# Patient Record
Sex: FEMALE | Race: OTHER | NOT HISPANIC OR LATINO
[De-identification: names, ages, dates, MRNs, and addresses within clinical notes are randomized per-mention and may not be internally consistent; named-entity substitution may affect disease eponyms.]

---

## 2018-10-15 PROBLEM — Z00.00 ENCOUNTER FOR PREVENTIVE HEALTH EXAMINATION: Status: ACTIVE | Noted: 2018-10-15

## 2018-10-24 ENCOUNTER — APPOINTMENT (OUTPATIENT)
Dept: ORTHOPEDIC SURGERY | Facility: CLINIC | Age: 48
End: 2018-10-24
Payer: COMMERCIAL

## 2018-10-24 VITALS
WEIGHT: 170 LBS | DIASTOLIC BLOOD PRESSURE: 87 MMHG | SYSTOLIC BLOOD PRESSURE: 133 MMHG | HEART RATE: 76 BPM | BODY MASS INDEX: 27.32 KG/M2 | HEIGHT: 66 IN

## 2018-10-24 PROCEDURE — 99203 OFFICE O/P NEW LOW 30 MIN: CPT | Mod: 25

## 2018-10-24 PROCEDURE — 20610 DRAIN/INJ JOINT/BURSA W/O US: CPT | Mod: RT

## 2018-11-05 ENCOUNTER — APPOINTMENT (OUTPATIENT)
Dept: ORTHOPEDIC SURGERY | Facility: CLINIC | Age: 48
End: 2018-11-05
Payer: COMMERCIAL

## 2018-11-05 VITALS
HEIGHT: 66 IN | WEIGHT: 170 LBS | DIASTOLIC BLOOD PRESSURE: 83 MMHG | HEART RATE: 64 BPM | SYSTOLIC BLOOD PRESSURE: 118 MMHG | BODY MASS INDEX: 27.32 KG/M2

## 2018-11-05 DIAGNOSIS — M75.41 IMPINGEMENT SYNDROME OF RIGHT SHOULDER: ICD-10-CM

## 2018-11-05 PROCEDURE — 99213 OFFICE O/P EST LOW 20 MIN: CPT

## 2018-11-05 RX ORDER — MELOXICAM 15 MG/1
15 TABLET ORAL DAILY
Qty: 30 | Refills: 1 | Status: ACTIVE | COMMUNITY
Start: 2018-11-05 | End: 1900-01-01

## 2019-01-14 ENCOUNTER — APPOINTMENT (OUTPATIENT)
Dept: ORTHOPEDIC SURGERY | Facility: CLINIC | Age: 49
End: 2019-01-14

## 2019-11-04 ENCOUNTER — APPOINTMENT (OUTPATIENT)
Dept: ORTHOPEDIC SURGERY | Facility: CLINIC | Age: 49
End: 2019-11-04
Payer: COMMERCIAL

## 2019-11-04 VITALS
SYSTOLIC BLOOD PRESSURE: 123 MMHG | WEIGHT: 176 LBS | HEART RATE: 75 BPM | HEIGHT: 66 IN | BODY MASS INDEX: 28.28 KG/M2 | DIASTOLIC BLOOD PRESSURE: 85 MMHG

## 2019-11-04 DIAGNOSIS — M75.01 ADHESIVE CAPSULITIS OF RIGHT SHOULDER: ICD-10-CM

## 2019-11-04 DIAGNOSIS — M25.511 PAIN IN RIGHT SHOULDER: ICD-10-CM

## 2019-11-04 PROCEDURE — 99213 OFFICE O/P EST LOW 20 MIN: CPT

## 2020-11-27 ENCOUNTER — EMERGENCY (EMERGENCY)
Facility: HOSPITAL | Age: 50
LOS: 1 days | Discharge: HOME | End: 2020-11-27
Attending: EMERGENCY MEDICINE | Admitting: EMERGENCY MEDICINE
Payer: COMMERCIAL

## 2020-11-27 VITALS
DIASTOLIC BLOOD PRESSURE: 84 MMHG | OXYGEN SATURATION: 97 % | HEIGHT: 66 IN | TEMPERATURE: 98 F | SYSTOLIC BLOOD PRESSURE: 122 MMHG | RESPIRATION RATE: 18 BRPM | HEART RATE: 67 BPM | WEIGHT: 179.9 LBS

## 2020-11-27 VITALS
DIASTOLIC BLOOD PRESSURE: 79 MMHG | HEART RATE: 71 BPM | TEMPERATURE: 98 F | OXYGEN SATURATION: 98 % | SYSTOLIC BLOOD PRESSURE: 118 MMHG | RESPIRATION RATE: 19 BRPM

## 2020-11-27 LAB
ALBUMIN SERPL ELPH-MCNC: 3.8 G/DL — SIGNIFICANT CHANGE UP (ref 3.3–5)
ALP SERPL-CCNC: 75 U/L — SIGNIFICANT CHANGE UP (ref 40–120)
ALT FLD-CCNC: 30 U/L — SIGNIFICANT CHANGE UP (ref 12–78)
ANION GAP SERPL CALC-SCNC: 8 MMOL/L — SIGNIFICANT CHANGE UP (ref 5–17)
APTT BLD: 33.2 SEC — SIGNIFICANT CHANGE UP (ref 27.5–35.5)
AST SERPL-CCNC: 18 U/L — SIGNIFICANT CHANGE UP (ref 15–37)
BASOPHILS # BLD AUTO: 0.03 K/UL — SIGNIFICANT CHANGE UP (ref 0–0.2)
BASOPHILS NFR BLD AUTO: 0.4 % — SIGNIFICANT CHANGE UP (ref 0–2)
BILIRUB SERPL-MCNC: 0.3 MG/DL — SIGNIFICANT CHANGE UP (ref 0.2–1.2)
BUN SERPL-MCNC: 11 MG/DL — SIGNIFICANT CHANGE UP (ref 7–23)
CALCIUM SERPL-MCNC: 8.7 MG/DL — SIGNIFICANT CHANGE UP (ref 8.5–10.1)
CHLORIDE SERPL-SCNC: 108 MMOL/L — SIGNIFICANT CHANGE UP (ref 96–108)
CK MB CFR SERPL CALC: <1 NG/ML — SIGNIFICANT CHANGE UP (ref 0–3.6)
CO2 SERPL-SCNC: 25 MMOL/L — SIGNIFICANT CHANGE UP (ref 22–31)
CREAT SERPL-MCNC: 0.78 MG/DL — SIGNIFICANT CHANGE UP (ref 0.5–1.3)
D DIMER BLD IA.RAPID-MCNC: 185 NG/ML DDU — SIGNIFICANT CHANGE UP
EOSINOPHIL # BLD AUTO: 0.12 K/UL — SIGNIFICANT CHANGE UP (ref 0–0.5)
EOSINOPHIL NFR BLD AUTO: 1.7 % — SIGNIFICANT CHANGE UP (ref 0–6)
GLUCOSE SERPL-MCNC: 99 MG/DL — SIGNIFICANT CHANGE UP (ref 70–99)
HCG SERPL-ACNC: <1 MIU/ML — SIGNIFICANT CHANGE UP
HCT VFR BLD CALC: 36.6 % — SIGNIFICANT CHANGE UP (ref 34.5–45)
HGB BLD-MCNC: 12 G/DL — SIGNIFICANT CHANGE UP (ref 11.5–15.5)
IMM GRANULOCYTES NFR BLD AUTO: 0.3 % — SIGNIFICANT CHANGE UP (ref 0–1.5)
INR BLD: 1.02 RATIO — SIGNIFICANT CHANGE UP (ref 0.88–1.16)
LYMPHOCYTES # BLD AUTO: 2.56 K/UL — SIGNIFICANT CHANGE UP (ref 1–3.3)
LYMPHOCYTES # BLD AUTO: 37.3 % — SIGNIFICANT CHANGE UP (ref 13–44)
MCHC RBC-ENTMCNC: 28.8 PG — SIGNIFICANT CHANGE UP (ref 27–34)
MCHC RBC-ENTMCNC: 32.8 GM/DL — SIGNIFICANT CHANGE UP (ref 32–36)
MCV RBC AUTO: 87.8 FL — SIGNIFICANT CHANGE UP (ref 80–100)
MONOCYTES # BLD AUTO: 0.47 K/UL — SIGNIFICANT CHANGE UP (ref 0–0.9)
MONOCYTES NFR BLD AUTO: 6.8 % — SIGNIFICANT CHANGE UP (ref 2–14)
NEUTROPHILS # BLD AUTO: 3.67 K/UL — SIGNIFICANT CHANGE UP (ref 1.8–7.4)
NEUTROPHILS NFR BLD AUTO: 53.5 % — SIGNIFICANT CHANGE UP (ref 43–77)
NRBC # BLD: 0 /100 WBCS — SIGNIFICANT CHANGE UP (ref 0–0)
NT-PROBNP SERPL-SCNC: 95 PG/ML — SIGNIFICANT CHANGE UP (ref 0–125)
PLATELET # BLD AUTO: 237 K/UL — SIGNIFICANT CHANGE UP (ref 150–400)
POTASSIUM SERPL-MCNC: 4 MMOL/L — SIGNIFICANT CHANGE UP (ref 3.5–5.3)
POTASSIUM SERPL-SCNC: 4 MMOL/L — SIGNIFICANT CHANGE UP (ref 3.5–5.3)
PROT SERPL-MCNC: 7.8 G/DL — SIGNIFICANT CHANGE UP (ref 6–8.3)
PROTHROM AB SERPL-ACNC: 11.9 SEC — SIGNIFICANT CHANGE UP (ref 10.6–13.6)
RBC # BLD: 4.17 M/UL — SIGNIFICANT CHANGE UP (ref 3.8–5.2)
RBC # FLD: 14 % — SIGNIFICANT CHANGE UP (ref 10.3–14.5)
SODIUM SERPL-SCNC: 141 MMOL/L — SIGNIFICANT CHANGE UP (ref 135–145)
TROPONIN I SERPL-MCNC: <.015 NG/ML — SIGNIFICANT CHANGE UP (ref 0.01–0.04)
WBC # BLD: 6.87 K/UL — SIGNIFICANT CHANGE UP (ref 3.8–10.5)
WBC # FLD AUTO: 6.87 K/UL — SIGNIFICANT CHANGE UP (ref 3.8–10.5)

## 2020-11-27 PROCEDURE — 82553 CREATINE MB FRACTION: CPT

## 2020-11-27 PROCEDURE — 85379 FIBRIN DEGRADATION QUANT: CPT

## 2020-11-27 PROCEDURE — 85025 COMPLETE CBC W/AUTO DIFF WBC: CPT

## 2020-11-27 PROCEDURE — 99284 EMERGENCY DEPT VISIT MOD MDM: CPT

## 2020-11-27 PROCEDURE — U0003: CPT

## 2020-11-27 PROCEDURE — 93010 ELECTROCARDIOGRAM REPORT: CPT

## 2020-11-27 PROCEDURE — 71045 X-RAY EXAM CHEST 1 VIEW: CPT

## 2020-11-27 PROCEDURE — 85730 THROMBOPLASTIN TIME PARTIAL: CPT

## 2020-11-27 PROCEDURE — 83880 ASSAY OF NATRIURETIC PEPTIDE: CPT

## 2020-11-27 PROCEDURE — 36415 COLL VENOUS BLD VENIPUNCTURE: CPT

## 2020-11-27 PROCEDURE — 84484 ASSAY OF TROPONIN QUANT: CPT

## 2020-11-27 PROCEDURE — 80053 COMPREHEN METABOLIC PANEL: CPT

## 2020-11-27 PROCEDURE — 93005 ELECTROCARDIOGRAM TRACING: CPT

## 2020-11-27 PROCEDURE — 85610 PROTHROMBIN TIME: CPT

## 2020-11-27 PROCEDURE — 71045 X-RAY EXAM CHEST 1 VIEW: CPT | Mod: 26

## 2020-11-27 PROCEDURE — 99284 EMERGENCY DEPT VISIT MOD MDM: CPT | Mod: 25

## 2020-11-27 PROCEDURE — 84702 CHORIONIC GONADOTROPIN TEST: CPT

## 2020-11-27 RX ORDER — IBUPROFEN 200 MG
600 TABLET ORAL ONCE
Refills: 0 | Status: COMPLETED | OUTPATIENT
Start: 2020-11-27 | End: 2020-11-27

## 2020-11-27 RX ORDER — SODIUM CHLORIDE 9 MG/ML
1000 INJECTION INTRAMUSCULAR; INTRAVENOUS; SUBCUTANEOUS ONCE
Refills: 0 | Status: COMPLETED | OUTPATIENT
Start: 2020-11-27 | End: 2020-11-27

## 2020-11-27 RX ADMIN — SODIUM CHLORIDE 1000 MILLILITER(S): 9 INJECTION INTRAMUSCULAR; INTRAVENOUS; SUBCUTANEOUS at 20:31

## 2020-11-27 RX ADMIN — Medication 600 MILLIGRAM(S): at 20:31

## 2020-11-27 NOTE — ED PROVIDER NOTE - TEMPLATE
[facility] [facility] EMERGENCY DEPT One [address] [id number] [phone] Work/School Note Date: 2/28/2018 To Whom It May concern: 
 
Nydia Romo was seen and treated today in the emergency room by the following provider(s): 
Attending Provider: Phoenix Peoples MD.   
 
Nydia Romo may return to school on 3/01/18, Please excuse is needed February 28.  
 
Sincerely, 
 
 
 
 
Phoenix Peoples MD 
 
 
 
 Respiratory

## 2020-11-27 NOTE — ED ADULT TRIAGE NOTE - CHIEF COMPLAINT QUOTE
feels headache, body aches and intermittent shortness of breath with exertion. Pt breathing unlabored in triage. Took tylenol today without much relief.

## 2020-11-27 NOTE — ED PROVIDER NOTE - CARE PROVIDER_API CALL
Casey Smith  INTERNAL MEDICINE  [id number] [place] [address]  Phone: [phone]  Fax: [phone]  Follow Up Time:

## 2020-11-27 NOTE — ED PROVIDER NOTE - OBJECTIVE STATEMENT
50 female presents to ER c/o shortness of breath, headache and body aches for 2 days, denies fever, no known covid exposure.

## 2020-11-27 NOTE — ED PROVIDER NOTE - PATIENT PORTAL LINK FT
You can access the FollowMyHealth Patient Portal offered by [facility] by registering at the following website: [email or web address]. By joining [facility]’s FollowMyHealth portal, you will also be able to view your health information using other applications (apps) compatible with our system.

## 2020-11-27 NOTE — ED PROVIDER NOTE - PROGRESS NOTE DETAILS
patient feeling well, wants to go home, labs, chest xray, ekg reviwed, no acute findings, pending covid, agrees to f/u with PMD, understands to return to ER for worsnieng of symptoms

## 2020-11-28 LAB — SARS-COV-2 RNA SPEC QL NAA+PROBE: SIGNIFICANT CHANGE UP

## 2021-04-26 NOTE — ED PROVIDER NOTE - CHPI ED SYMPTOMS NEG
no diaphoresis/no fever/no hemoptysis Xerosis Normal Treatment: I recommended application of Cetaphil or CeraVe numerous times a day going to bed to all dry areas.

## 2023-03-16 ENCOUNTER — EMERGENCY (EMERGENCY)
Facility: HOSPITAL | Age: 53
LOS: 1 days | Discharge: HOME | End: 2023-03-16
Attending: EMERGENCY MEDICINE | Admitting: EMERGENCY MEDICINE
Payer: COMMERCIAL

## 2023-03-16 VITALS
SYSTOLIC BLOOD PRESSURE: 144 MMHG | DIASTOLIC BLOOD PRESSURE: 81 MMHG | WEIGHT: 182.98 LBS | TEMPERATURE: 98 F | HEIGHT: 66 IN | HEART RATE: 73 BPM | OXYGEN SATURATION: 97 % | RESPIRATION RATE: 16 BRPM

## 2023-03-16 VITALS
OXYGEN SATURATION: 99 % | HEART RATE: 75 BPM | DIASTOLIC BLOOD PRESSURE: 80 MMHG | SYSTOLIC BLOOD PRESSURE: 125 MMHG | RESPIRATION RATE: 18 BRPM | TEMPERATURE: 98 F

## 2023-03-16 PROCEDURE — G1004: CPT

## 2023-03-16 PROCEDURE — 72125 CT NECK SPINE W/O DYE: CPT | Mod: MG

## 2023-03-16 PROCEDURE — 99285 EMERGENCY DEPT VISIT HI MDM: CPT

## 2023-03-16 PROCEDURE — 73564 X-RAY EXAM KNEE 4 OR MORE: CPT | Mod: 26,LT,77

## 2023-03-16 PROCEDURE — 90715 TDAP VACCINE 7 YRS/> IM: CPT

## 2023-03-16 PROCEDURE — 73080 X-RAY EXAM OF ELBOW: CPT

## 2023-03-16 PROCEDURE — 70486 CT MAXILLOFACIAL W/O DYE: CPT | Mod: 26,MA

## 2023-03-16 PROCEDURE — 73564 X-RAY EXAM KNEE 4 OR MORE: CPT | Mod: 26,RT

## 2023-03-16 PROCEDURE — 90471 IMMUNIZATION ADMIN: CPT

## 2023-03-16 PROCEDURE — 70486 CT MAXILLOFACIAL W/O DYE: CPT | Mod: MA

## 2023-03-16 PROCEDURE — 70450 CT HEAD/BRAIN W/O DYE: CPT | Mod: MG

## 2023-03-16 PROCEDURE — 72125 CT NECK SPINE W/O DYE: CPT | Mod: 26,MG

## 2023-03-16 PROCEDURE — 99284 EMERGENCY DEPT VISIT MOD MDM: CPT | Mod: 25

## 2023-03-16 PROCEDURE — 73080 X-RAY EXAM OF ELBOW: CPT | Mod: 26,RT

## 2023-03-16 PROCEDURE — 70450 CT HEAD/BRAIN W/O DYE: CPT | Mod: 26,MG

## 2023-03-16 PROCEDURE — 73564 X-RAY EXAM KNEE 4 OR MORE: CPT

## 2023-03-16 RX ORDER — TETANUS TOXOID, REDUCED DIPHTHERIA TOXOID AND ACELLULAR PERTUSSIS VACCINE, ADSORBED 5; 2.5; 8; 8; 2.5 [IU]/.5ML; [IU]/.5ML; UG/.5ML; UG/.5ML; UG/.5ML
0.5 SUSPENSION INTRAMUSCULAR ONCE
Refills: 0 | Status: COMPLETED | OUTPATIENT
Start: 2023-03-16 | End: 2023-03-16

## 2023-03-16 RX ADMIN — TETANUS TOXOID, REDUCED DIPHTHERIA TOXOID AND ACELLULAR PERTUSSIS VACCINE, ADSORBED 0.5 MILLILITER(S): 5; 2.5; 8; 8; 2.5 SUSPENSION INTRAMUSCULAR at 19:21

## 2023-03-16 RX ADMIN — Medication 1 TABLET(S): at 22:34

## 2023-03-16 NOTE — ED PROVIDER NOTE - CLINICAL SUMMARY MEDICAL DECISION MAKING FREE TEXT BOX
Mechanical fall today with head injury and bilateral knee and elbow pain.  No anticoagulant use.  Will check CT head, neck, face.  Will check x-rays of bilateral knees and right elbow.  Outpatient follow-up.

## 2023-03-16 NOTE — ED PROVIDER NOTE - WR INTERPRETATION DATE TIME  2
Patient Active Problem List   Diagnosis   • HBP, Rx 1994   • DIABETES UNCOMPL ADULT-TYPE II, 1999   • HYPERLIPIDEMIA   chol 278 in 2002   • Gout, unspecified   • Nonspecific elevation of levels of transaminase or lactic acid dehydrogenase (LDH)   • Allergic rhinitis, cause unspecified   • HYPOTHYROIDISM  TSH--6.94, 2006   • ED   • COLONIC POLYPS, adenoma, 2009     Had mid foot pain in Dec.  OK now.  On allopurinol 300/d.  Sugars 90 to 110, no reactions.   BP at home was 120/70.  No prob w Rx.    PE Chest CTA. Ht RR, no mur, ext wo edema  LDL 85,  glyco 6.4,  Hep C neg, urate 7.4    IMP Transient podagra, now resolved w urate of 7.4          DM, OK          HBP, OK          LDL OK    PLAN options disc, Same Rx, declines flushot,  RTC 6 mo w lab, wellness visit         16-Mar-2023 20:37

## 2023-03-16 NOTE — ED PROVIDER NOTE - CARE PLAN
Principal Discharge DX:	Facial contusion, initial encounter  Secondary Diagnosis:	Knee contusion   1 Principal Discharge DX:	Facial contusion, initial encounter  Secondary Diagnosis:	Knee contusion  Secondary Diagnosis:	Nasal fracture

## 2023-03-16 NOTE — ED PROVIDER NOTE - PROVIDER TOKENS
PROVIDER:[TOKEN:[[id number]]],PROVIDER:[TOKEN:[[id number]]] PROVIDER:[TOKEN:[[id number]]],PROVIDER:[TOKEN:[[id number]]],PROVIDER:[TOKEN:[[id number]]]

## 2023-03-16 NOTE — ED PROVIDER NOTE - NSFOLLOWUPINSTRUCTIONS_ED_ALL_ED_FT
1) Follow-up with your Primary Medical Doctor. Call tomorrow for prompt follow-up.  2) Return to Emergency room for any worsening or persistent pain, shortness of breath, chest pains, abdominal pain, numbness, tingling, headaches, vomiting, visual changes, dizziness, weakness, fever, or any other concerning symptoms.  3) See attached instruction sheets for additional information, including information regarding signs and symptoms to look out for, reasons to seek immediate care and other important instructions.  4) Follow-up with orthopedics, call tomorrow for prompt follow-up  5) Percocet as needed for pain, no driving 1) Follow-up with your Primary Medical Doctor and Dr. Sandoval. Call tomorrow for prompt follow-up.  2) Return to Emergency room for any worsening or persistent pain, shortness of breath, chest pains, abdominal pain, numbness, tingling, headaches, vomiting, visual changes, dizziness, weakness, fever, or any other concerning symptoms.  3) See attached instruction sheets for additional information, including information regarding signs and symptoms to look out for, reasons to seek immediate care and other important instructions.  4) Follow-up with orthopedics, call tomorrow for prompt follow-up  5) Percocet as needed for pain, no driving      A nasal fracture is a break or crack in the bones of the nose or the tissue that helps to form the nose (cartilage). Minor breaks do not require treatment and usually heal on their own after about one month. Serious breaks may require treatment that could include surgery.      What are the causes?    A nasal fracture is usually caused by the strong force of a direct hit to the nose (blunt injury). This type of injury often occurs from:  •Playing a contact sport.      •Being involved in a car accident.      •Falling.      •Getting punched in the face.        What are the signs or symptoms?    Symptoms of this condition include:  •Pain.      •Swelling of the nose.      •Bleeding from the nose.      •Bruising around the nose or bruising around the eyes (black eyes).      •Crooked appearance of the nose.        How is this diagnosed?    This condition may be diagnosed based on a physical exam. During the exam, the health care provider will:  •Gently feel the nose for signs of broken bones.      •Look inside the nostrils to check if there is a blood-filled swelling on the dividing wall between the nostrils (septal hematoma).      An X-ray of the nose may be taken. Sometimes, an X-ray may not show a nasal fracture even when one is present. In some cases, X-rays or a CT scan may be taken again 1–5 days later after the swelling has gone down.      How is this treated?    Treatment for this condition depends on the severity of the injury.  •Minor fractures that have not caused deformity often do not require treatment. They may heal on their own.    •For more serious fractures that have caused bones to move out of position, treatment may involve one of the following:  •Repositioning the bones without surgery. The health care provider may be able to do this in his or her office after you are given medicine to numb the nasal area (local anesthetic).      •Surgery. If surgery is needed, it will be done after the swelling is gone. Surgery will stabilize and align the fracture.          Follow these instructions at home:      Bag of ice on a towel on the skin.        The correct and incorrect way to hold your head and fingers for first aid during a nosebleed.     Activity     •Return to your normal activities as told by your health care provider. Ask your health care provider what activities are safe for you.      • Do not play contact sports for 3–4 weeks or as told by your health care provider.      Managing pain and swelling     If directed, put ice on the injured area. To do this:  •Put ice in a plastic bag.      •Place a towel between your skin and the bag.      •Leave the ice on for 20 minutes, 2–3 times a day.      •Take off the ice if your skin turns bright red. This is very important. If you cannot feel pain, heat, or cold, you have a greater risk of damage to the area.      General instructions    •Take over-the-counter and prescription medicines only as told by your health care provider.      •If your nose starts to bleed, sit in an upright position while you squeeze the soft parts of your nose against the dividing wall between your nostrils (septum) for 10 minutes.      •Try to avoid blowing your nose.      •Keep all follow-up visits. This is important.        Contact a health care provider if:    •Your pain increases or becomes severe.      •You continue to have nosebleeds.      •The shape of your nose does not return to normal within 5 days.      •You have pus draining out of your nose.        Get help right away if:    •You have bleeding from your nose that does not stop after you pinch your nostrils closed for 20 minutes and keep ice on your nose.      •You have clear fluid draining out of your nose.      •You notice swelling near the septum inside the nose. This swelling is a septal hematoma that must be drained to help prevent infection.      •You have difficulty moving your eyes.      •You have repeated vomiting.      These symptoms may be an emergency. Get help right away. Call 911.   • Do not wait to see if the symptoms will go away.       • Do not drive yourself to the hospital.         Summary    •A nasal fracture is a break or crack in the bones or cartilage of the nose.      •The fracture is usually caused by a blunt injury to the nose.      •Symptoms include pain, swelling, and facial bruising.      •Nasal fractures may heal on their own, or your health care provider may need to move the bones back into proper position. In some cases, surgery may be needed.      This information is not intended to replace advice given to you by your health care provider. Make sure you discuss any questions you have with your health care provider.

## 2023-03-16 NOTE — ED PROVIDER NOTE - WR ORDER NAME 1
IUP 34.3 Patient presents to L&D antepartum from [facility] office for abdominal cramping. Patient denies LOF, VB, Contractions. Patient states good fetal movement. Placed on EFM, FHTs present. Maria Ines Cooney CNP notified of patient. Xray Knee 4 Views, Left

## 2023-03-16 NOTE — ED PROVIDER NOTE - PATIENT PORTAL LINK FT
You can access the FollowMyHealth Patient Portal offered by [facility] by registering at the following website: [email or web address]. By joining [facility]’s FollowMyHealth portal, you will also be able to view your health information using other applications (apps) compatible with our system.

## 2023-03-16 NOTE — ED PROVIDER NOTE - PHYSICAL EXAMINATION
Multiple abrasions to anterior nose with mild tenderness.  No laceration to the nose.  No septal hematoma.  No active bleeding from the nose.  No other signs of facial trauma.  No head trauma.  Small contusion to the right elbow with full range of motion and no bony tenderness.  Abrasions to bilateral anterior knees with mild tenderness.  Full range of motion with no laxity bilaterally.  Normal distal strength and sensation equal bilaterally.  2+ pulses.

## 2023-03-16 NOTE — ED PROVIDER NOTE - NSCAREINITIATED _GEN_ER
[FreeTextEntry1] : Laboratories (November 4, 220) reviewed and significant for: \par Unremarkable complete blood count\par \par Laboratories (September 14, 2020) reviewed and significant for: \par Calcium 9.2 mg/dL (albumin 4.1 g/dL)\par BUN/creatinine 21/0.70 mg/dL \par Alkaline phosphatase 65 U/L\par \par Most recent bone mineral density\par Date: June 18, 2019\par Source: [employer]\par Site: [facility]\par \par Site	BMD (g/cm2)	T-score	Change previous	Change baseline	\par Lumbar spine	0.887	-2.4\par Femoral neck	0.797	-1.7	\par Total hip	                0.856       -1.2         \par Distal radius           	                \par DXA Comments: L4 excluded; left hip sites Uriel Escobar(Attending)

## 2023-03-16 NOTE — ED ADULT NURSE NOTE - OBJECTIVE STATEMENT
patient had a trip and fall. no loc. patient fell face first off a curb. pain to the neck and nose, no blood thinners.

## 2023-03-16 NOTE — ED PROVIDER NOTE - OBJECTIVE STATEMENT
52-year-old female with no significant past medical history presents with mechanical trip and fall today while shopping.  Patient reported tripped and fell on her bilateral knees, hitting her face on the ground.  Patient also hit her right elbow.  Patient complains of nasal abrasions and pain.  No LOC.  No weakness or dizziness prior or after the fall.  Some slight neck discomfort.  No numbness/tingling/focal weakness.  No chest pain or shortness of breath.  No acute abdominal pain.  No truncal trauma.  This occurred around 1 hour prior to arrival.  No aggravating or alleviating factors otherwise noted.  No other acute injury or complaints.  Patient previously vaccinated for COVID, no recent exposures.

## 2023-03-16 NOTE — ED PROVIDER NOTE - CARE PROVIDER_API CALL
Giovani Mckeon  ORTHOPAEDIC SURGERY  [address] [place] [address]  Phone: [phone]  Fax: [phone]  Follow Up Time:     Casey Smith)  Internal Medicine  [address] [place] [address]  Phone: [phone]  Fax: [phone]  Follow Up Time:    Giovani Mckeon  ORTHOPAEDIC SURGERY  [address] [place] [address]  Phone: [phone]  Fax: [phone]  Follow Up Time:     Casey Smith)  Internal Medicine  [address] [place] [address]  Phone: [phone]  Fax: [phone]  Follow Up Time:     Vin Sandoval  PLASTIC SURGERY  [address] [place] [address]  Phone: [phone]  Fax: [phone]  Follow Up Time:

## 2023-03-16 NOTE — ED PROVIDER NOTE - DIFFERENTIAL DIAGNOSIS
Rule out acute intracranial hemorrhage, skull fracture, facial bone fracture, cervical spine fracture, fracture of the knees/kneecaps, elbow. Differential Diagnosis

## 2023-03-16 NOTE — ED PROVIDER NOTE - MUSCULOSKELETAL, MLM
Spine appears normal, No cervical, thoracic, or lumbar tenderness. range of motion is not limited, no muscle or joint tenderness

## 2023-03-16 NOTE — ED PROVIDER NOTE - RESPIRATORY, MLM
[facility]  [address] [address]  Phone: [phone]  Fax: [phone]    Melodie Ro MD        December 22, 2022     Patient: Gisele Joe   YOB: 1964   Date of Visit: 12/22/2022       To Whom it May Concern:    Gisele Joe was seen in my clinic on 12/22/2022. If you have any questions or concerns, please don't hesitate to call.     Sincerely,         Melodie Ro MD Breath sounds clear and equal bilaterally. nl resp effort. no w/r/r. No chest wall tenderness

## 2023-03-17 PROBLEM — I10 ESSENTIAL (PRIMARY) HYPERTENSION: Chronic | Status: ACTIVE | Noted: 2020-11-27

## 2023-03-23 ENCOUNTER — NON-APPOINTMENT (OUTPATIENT)
Age: 53
End: 2023-03-23

## 2023-03-23 ENCOUNTER — APPOINTMENT (OUTPATIENT)
Dept: OTOLARYNGOLOGY | Facility: CLINIC | Age: 53
End: 2023-03-23
Payer: COMMERCIAL

## 2023-03-23 VITALS
BODY MASS INDEX: 29.25 KG/M2 | DIASTOLIC BLOOD PRESSURE: 78 MMHG | HEART RATE: 66 BPM | WEIGHT: 182 LBS | HEIGHT: 66 IN | SYSTOLIC BLOOD PRESSURE: 117 MMHG | TEMPERATURE: 97.8 F

## 2023-03-23 DIAGNOSIS — S02.2XXA FRACTURE OF NASAL BONES, INITIAL ENCOUNTER FOR CLOSED FRACTURE: ICD-10-CM

## 2023-03-23 DIAGNOSIS — J34.2 DEVIATED NASAL SEPTUM: ICD-10-CM

## 2023-03-23 PROCEDURE — 99204 OFFICE O/P NEW MOD 45 MIN: CPT | Mod: 25

## 2023-03-23 PROCEDURE — 31231 NASAL ENDOSCOPY DX: CPT

## 2023-03-23 RX ORDER — AMOXICILLIN AND CLAVULANATE POTASSIUM 875; 125 MG/1; MG/1
875-125 TABLET, COATED ORAL
Refills: 0 | Status: ACTIVE | COMMUNITY

## 2023-03-29 ENCOUNTER — OUTPATIENT (OUTPATIENT)
Dept: OUTPATIENT SERVICES | Facility: HOSPITAL | Age: 53
LOS: 1 days | End: 2023-03-29
Payer: COMMERCIAL

## 2023-03-29 VITALS
OXYGEN SATURATION: 98 % | WEIGHT: 188.05 LBS | SYSTOLIC BLOOD PRESSURE: 136 MMHG | HEIGHT: 66 IN | DIASTOLIC BLOOD PRESSURE: 90 MMHG | HEART RATE: 70 BPM | TEMPERATURE: 98 F | RESPIRATION RATE: 16 BRPM

## 2023-03-29 DIAGNOSIS — I10 ESSENTIAL (PRIMARY) HYPERTENSION: ICD-10-CM

## 2023-03-29 DIAGNOSIS — S02.2XXA FRACTURE OF NASAL BONES, INITIAL ENCOUNTER FOR CLOSED FRACTURE: ICD-10-CM

## 2023-03-29 DIAGNOSIS — R06.09 OTHER FORMS OF DYSPNEA: ICD-10-CM

## 2023-03-29 DIAGNOSIS — Z90.49 ACQUIRED ABSENCE OF OTHER SPECIFIED PARTS OF DIGESTIVE TRACT: Chronic | ICD-10-CM

## 2023-03-29 DIAGNOSIS — Z91.89 OTHER SPECIFIED PERSONAL RISK FACTORS, NOT ELSEWHERE CLASSIFIED: ICD-10-CM

## 2023-03-29 DIAGNOSIS — R68.89 OTHER GENERAL SYMPTOMS AND SIGNS: ICD-10-CM

## 2023-03-29 LAB
ANION GAP SERPL CALC-SCNC: 11 MMOL/L — SIGNIFICANT CHANGE UP (ref 7–14)
BASOPHILS # BLD AUTO: 0.03 K/UL — SIGNIFICANT CHANGE UP (ref 0–0.2)
BASOPHILS NFR BLD AUTO: 0.5 % — SIGNIFICANT CHANGE UP (ref 0–2)
BUN SERPL-MCNC: 9 MG/DL — SIGNIFICANT CHANGE UP (ref 7–23)
CALCIUM SERPL-MCNC: 9.6 MG/DL — SIGNIFICANT CHANGE UP (ref 8.4–10.5)
CHLORIDE SERPL-SCNC: 103 MMOL/L — SIGNIFICANT CHANGE UP (ref 98–107)
CO2 SERPL-SCNC: 24 MMOL/L — SIGNIFICANT CHANGE UP (ref 22–31)
CREAT SERPL-MCNC: 0.58 MG/DL — SIGNIFICANT CHANGE UP (ref 0.5–1.3)
EGFR: 109 ML/MIN/1.73M2 — SIGNIFICANT CHANGE UP
EOSINOPHIL # BLD AUTO: 0.07 K/UL — SIGNIFICANT CHANGE UP (ref 0–0.5)
EOSINOPHIL NFR BLD AUTO: 1.2 % — SIGNIFICANT CHANGE UP (ref 0–6)
GLUCOSE SERPL-MCNC: 84 MG/DL — SIGNIFICANT CHANGE UP (ref 70–99)
HCG UR QL: NEGATIVE — SIGNIFICANT CHANGE UP
HCT VFR BLD CALC: 37.7 % — SIGNIFICANT CHANGE UP (ref 34.5–45)
HGB BLD-MCNC: 11.5 G/DL — SIGNIFICANT CHANGE UP (ref 11.5–15.5)
IANC: 2.9 K/UL — SIGNIFICANT CHANGE UP (ref 1.8–7.4)
IMM GRANULOCYTES NFR BLD AUTO: 0.2 % — SIGNIFICANT CHANGE UP (ref 0–0.9)
LYMPHOCYTES # BLD AUTO: 2.32 K/UL — SIGNIFICANT CHANGE UP (ref 1–3.3)
LYMPHOCYTES # BLD AUTO: 40.4 % — SIGNIFICANT CHANGE UP (ref 13–44)
MCHC RBC-ENTMCNC: 26.1 PG — LOW (ref 27–34)
MCHC RBC-ENTMCNC: 30.5 GM/DL — LOW (ref 32–36)
MCV RBC AUTO: 85.5 FL — SIGNIFICANT CHANGE UP (ref 80–100)
MONOCYTES # BLD AUTO: 0.41 K/UL — SIGNIFICANT CHANGE UP (ref 0–0.9)
MONOCYTES NFR BLD AUTO: 7.1 % — SIGNIFICANT CHANGE UP (ref 2–14)
NEUTROPHILS # BLD AUTO: 2.9 K/UL — SIGNIFICANT CHANGE UP (ref 1.8–7.4)
NEUTROPHILS NFR BLD AUTO: 50.6 % — SIGNIFICANT CHANGE UP (ref 43–77)
NRBC # BLD: 0 /100 WBCS — SIGNIFICANT CHANGE UP (ref 0–0)
NRBC # FLD: 0 K/UL — SIGNIFICANT CHANGE UP (ref 0–0)
PLATELET # BLD AUTO: 269 K/UL — SIGNIFICANT CHANGE UP (ref 150–400)
POTASSIUM SERPL-MCNC: 3.9 MMOL/L — SIGNIFICANT CHANGE UP (ref 3.5–5.3)
POTASSIUM SERPL-SCNC: 3.9 MMOL/L — SIGNIFICANT CHANGE UP (ref 3.5–5.3)
RBC # BLD: 4.41 M/UL — SIGNIFICANT CHANGE UP (ref 3.8–5.2)
RBC # FLD: 15.5 % — HIGH (ref 10.3–14.5)
SODIUM SERPL-SCNC: 138 MMOL/L — SIGNIFICANT CHANGE UP (ref 135–145)
WBC # BLD: 5.74 K/UL — SIGNIFICANT CHANGE UP (ref 3.8–10.5)
WBC # FLD AUTO: 5.74 K/UL — SIGNIFICANT CHANGE UP (ref 3.8–10.5)

## 2023-03-29 PROCEDURE — 93010 ELECTROCARDIOGRAM REPORT: CPT

## 2023-03-29 NOTE — H&P PST ADULT - PROBLEM SELECTOR PLAN 1
Patient tentatively scheduled for nasal septal reconstruction on 04/05/23    Pre-op instructions provided. Pt given verbal and written instructions with teach back on pepcid. Pt verbalized understanding with return demonstration.    Labs done Patient tentatively scheduled for nasal septal reconstruction on 04/05/23    Pre-op instructions provided. Pt given verbal and written instructions with teach back on pepcid. Pt verbalized understanding with return demonstration.    Labs done.    Urine cup provided for day of procedure pregnancy test.

## 2023-03-29 NOTE — H&P PST ADULT - PROBLEM/PLAN-1
[facility] pt with two pt identifiers(name and ). Chief Complaint   Patient presents with   [address] [facility] [place] Annual Wellness Visit    Hypertension    Chronic Kidney Disease       Reviewed record In preparation for visit and have obtained necessary documentation. 1. Have you been to the ER, urgent care clinic or hospitalized since your last visit? No     2. Have you seen or consulted any other health care providers outside of the [address] [place] [address] since your last visit? Include any pap smears or colon screening. No    Vitals reviewed with provider.     Health Maintenance reviewed:     Health Maintenance Due   Topic    DTaP/Tdap/Td series (1 - Tdap)    Medicare Yearly Exam     Lipid Screen           Wt Readings from Last 3 Encounters:   21 200 lb 8 oz (90.9 kg)   10/01/20 192 lb 12.8 oz (87.5 kg)   20 191 lb 5.8 oz (86.8 kg)        Temp Readings from Last 3 Encounters:   21 98.5 °F (36.9 °C) (Oral)   10/01/20 97.9 °F (36.6 °C) (Temporal)   20 98.3 °F (36.8 °C)        BP Readings from Last 3 Encounters:   21 (!) 173/86   10/01/20 (!) 167/74   20 157/82        Pulse Readings from Last 3 Encounters:   21 60   10/01/20 66   20 68        Vitals:    21 1134   BP: (!) 173/86   Pulse: 60   Resp: 12   Temp: 98.5 °F (36.9 °C)   TempSrc: Oral   SpO2: 98%   Weight: 200 lb 8 oz (90.9 kg)   Height: 6' (1.829 m)   PainSc:   0 - No pain          Learning Assessment:   :       Learning Assessment 2014   PRIMARY LEARNER Patient   PRIMARY LANGUAGE ENGLISH   LEARNER PREFERENCE PRIMARY VIDEOS     PICTURES     DEMONSTRATION   ANSWERED BY patient   RELATIONSHIP SELF        Depression Screening:   :       3 most recent PHQ Screens 2021   Little interest or pleasure in doing things Not at all   Feeling down, depressed, irritable, or hopeless Not at all   Total Score PHQ 2 0        Fall Risk Assessment:   :       Fall Risk Assessment, last 12 mths 5/19/2021   Able to walk? Yes   Fall in past 12 months? 0   Do you feel unsteady? 0   Are you worried about falling 0        Abuse Screening:   :       Abuse Screening Questionnaire 5/19/2021 5/22/2019 1/17/2018   Do you ever feel afraid of your partner? N N N   Are you in a relationship with someone who physically or mentally threatens you? N N N   Is it safe for you to go home?  Y Y Y        ADL Screening:   :       ADL Assessment 5/19/2021   Feeding yourself No Help Needed   Getting from bed to chair No Help Needed   Getting dressed No Help Needed   Bathing or showering No Help Needed   Walk across the room (includes cane/walker) No Help Needed   Using the telphone No Help Needed   Taking your medications No Help Needed   Preparing meals No Help Needed   Managing money (expenses/bills) No Help Needed   Moderately strenuous housework (laundry) No Help Needed   Shopping for personal items (toiletries/medicines) No Help Needed   Shopping for groceries No Help Needed   Driving No Help Needed   Climbing a flight of stairs No Help Needed   Getting to places beyond walking distances No Help Needed DISPLAY PLAN FREE TEXT

## 2023-03-29 NOTE — H&P PST ADULT - HISTORY OF PRESENT ILLNESS
52 year old Tito speaking female with pre op dx of fracture of nasal bones, initial encounter for closed fracture is scheduled for nasal septal reconstruction.

## 2023-03-29 NOTE — H&P PST ADULT - NSANTHOSAYNRD_GEN_A_CORE
Renata Guadalupe(Resident) No. [facility] screening performed.  STOP BANG Legend: 0-2 = LOW Risk; 3-4 = INTERMEDIATE Risk; 5-8 = HIGH Risk

## 2023-03-29 NOTE — H&P PST ADULT - ADDITIONAL PE
Mallampati- 3  Denies dentures. Denies loose teeth. Mallampati- 2  Denies dentures. Denies loose teeth.

## 2023-03-29 NOTE — H&P PST ADULT - ENMT COMMENTS
Pt reports Pre op dx- Fracture of nasal bones, initial encounter for closed fracture Pt reports , she  fell and hit her nose on the concrete curb outside the parking lot in the grocery store last week.  CT  face done & showed a comminuted nasal fracture.

## 2023-03-29 NOTE — H&P PST ADULT - NSICDXPASTMEDICALHX_GEN_ALL_CORE_FT
PAST MEDICAL HISTORY:  Fracture of nasal bones, initial encounter for closed fracture     HTN (hypertension)     Palpitation

## 2023-03-29 NOTE — H&P PST ADULT - FUNCTIONAL STATUS
Walks 1 to 2 blocks, climbs 1 flight of stairs, ADLs/4-10 METS Walks 1 to 2 blocks, climbs 1 flight of stairs, ADLs- activity intolerance - dyspnea on exertion/less than 4 METS

## 2023-03-29 NOTE — H&P PST ADULT - PROBLEM SELECTOR PLAN 4
Unable to assess  mets due to  dyspnea on exertion- activity intolerance .  Requesting cardiology evaluation , echo , stress and angiogram reports.

## 2023-04-04 ENCOUNTER — TRANSCRIPTION ENCOUNTER (OUTPATIENT)
Age: 53
End: 2023-04-04

## 2023-04-04 VITALS
HEIGHT: 66 IN | WEIGHT: 188.05 LBS | DIASTOLIC BLOOD PRESSURE: 72 MMHG | HEART RATE: 67 BPM | RESPIRATION RATE: 16 BRPM | OXYGEN SATURATION: 98 % | SYSTOLIC BLOOD PRESSURE: 148 MMHG | TEMPERATURE: 98 F

## 2023-04-04 NOTE — ASU PREOPERATIVE ASSESSMENT, ADULT (IPARK ONLY) - FALL HARM RISK - RISK INTERVENTIONS

## 2023-04-04 NOTE — PHYSICAL EXAM
[Nasal Endoscopy Performed] : nasal endoscopy was performed, see procedure section for findings [] : septum deviated to the right [Midline] : trachea located in midline position [Normal] : no rashes [de-identified] : nasal bone is deflected to the left on appearance; soft tissue swelling under the eyes with mild bruising noted

## 2023-04-04 NOTE — HISTORY OF PRESENT ILLNESS
[de-identified] : Patient fell and hit her nose on the concrete curb outisde the parking lot in the grocery store last week. \par She went to [facility] and had a CT of the face which showed a comminuted nasal fracture. \par She is on Augmentin since the incident. \par She has nasal congestion in both nasal cavities, worse int he left nose.

## 2023-04-04 NOTE — CONSULT LETTER
[Dear  ___] : Dear  [unfilled], [Consult Letter:] : I had the pleasure of evaluating your patient, [unfilled]. [Please see my note below.] : Please see my note below. [Consult Closing:] : Thank you very much for allowing me to participate in the care of this patient.  If you have any questions, please do not hesitate to contact me. [Sincerely,] : Sincerely, [FreeTextEntry3] : Nicolás Camargo MD\par [facility]\par Otolaryngology and Facial Plastics\par Associated Professor, Stefan\par

## 2023-04-04 NOTE — END OF VISIT
[FreeTextEntry3] : I, Dr. Camargo personally performed the evaluation and management (E/M) services including all necessary procedures, for this new patient. That E/M includes conducting the clinically appropriate initial history &/or exam, assessing all conditions, and establishing the plan of care. Today, my DEMARIO, Nikki Cole, was here to observe &/or participate in the visit & follow plan of care established by me.\par \par \par

## 2023-04-04 NOTE — ASSESSMENT
[FreeTextEntry1] : 52-year-old female who fell face first tripped on a curb resulted in a comminuted nasal fracture.  Significant amount of ecchymosis.  Photos were taken.  She is indicated for a nasal septal reconstruction.  We will get her scheduled in the near future all risks and benefits were discussed and accepted.

## 2023-04-05 ENCOUNTER — TRANSCRIPTION ENCOUNTER (OUTPATIENT)
Age: 53
End: 2023-04-05

## 2023-04-05 ENCOUNTER — OUTPATIENT (OUTPATIENT)
Dept: OUTPATIENT SERVICES | Facility: HOSPITAL | Age: 53
LOS: 1 days | Discharge: HOME | End: 2023-04-05
Payer: COMMERCIAL

## 2023-04-05 ENCOUNTER — APPOINTMENT (OUTPATIENT)
Dept: OTOLARYNGOLOGY | Facility: AMBULATORY SURGERY CENTER | Age: 53
End: 2023-04-05

## 2023-04-05 VITALS
OXYGEN SATURATION: 99 % | SYSTOLIC BLOOD PRESSURE: 121 MMHG | TEMPERATURE: 98 F | RESPIRATION RATE: 19 BRPM | DIASTOLIC BLOOD PRESSURE: 71 MMHG | HEART RATE: 66 BPM

## 2023-04-05 DIAGNOSIS — S02.2XXA FRACTURE OF NASAL BONES, INITIAL ENCOUNTER FOR CLOSED FRACTURE: ICD-10-CM

## 2023-04-05 DIAGNOSIS — Z90.49 ACQUIRED ABSENCE OF OTHER SPECIFIED PARTS OF DIGESTIVE TRACT: Chronic | ICD-10-CM

## 2023-04-05 PROCEDURE — 21335 OPEN TX NOSE & SEPTAL FX: CPT

## 2023-04-05 RX ORDER — ATENOLOL 25 MG/1
1 TABLET ORAL
Qty: 0 | Refills: 0 | DISCHARGE

## 2023-04-05 RX ORDER — PREGABALIN 225 MG/1
0 CAPSULE ORAL
Refills: 0 | DISCHARGE

## 2023-04-05 RX ORDER — CHOLECALCIFEROL (VITAMIN D3) 125 MCG
0 CAPSULE ORAL
Refills: 0 | DISCHARGE

## 2023-04-05 RX ORDER — ACETAMINOPHEN WITH CODEINE 300MG-30MG
1 TABLET ORAL
Qty: 8 | Refills: 0
Start: 2023-04-05 | End: 2023-04-06

## 2023-04-05 RX ORDER — ACETAMINOPHEN AND CODEINE 300; 30 MG/1; MG/1
300-30 TABLET ORAL
Qty: 8 | Refills: 0 | Status: ACTIVE | COMMUNITY
Start: 2023-04-05 | End: 1900-01-01

## 2023-04-05 RX ORDER — AZITHROMYCIN 250 MG/1
250 TABLET, FILM COATED ORAL
Qty: 1 | Refills: 0 | Status: ACTIVE | COMMUNITY
Start: 2023-04-05 | End: 1900-01-01

## 2023-04-05 RX ORDER — AZITHROMYCIN 500 MG/1
1 TABLET, FILM COATED ORAL
Qty: 1 | Refills: 0
Start: 2023-04-05 | End: 2023-04-09

## 2023-04-05 NOTE — ASU DISCHARGE PLAN (ADULT/PEDIATRIC) - CARE PROVIDER_API CALL
Nicolás Camargo (MD)  Facial Plastic and Reconstructive Surgery; Otolaryngology  [address] [place] [address], [facility] [address]  [place] [address]  Phone: [phone]  Fax: [phone]  Follow Up Time:

## 2023-04-05 NOTE — ASU DISCHARGE PLAN (ADULT/PEDIATRIC) - ACTIVITY LEVEL
No excercise/No heavy lifting/No sports/gym no bending 2 weeks/No excercise/No heavy lifting/No sports/gym

## 2023-04-05 NOTE — PRE-OP CHECKLIST - TYPE OF SOLUTION
4.25 liters of light colette fluid removed from left side abdomen puncture site, site WDL, band aid placed. Pt d/c home in stable condition with ride.  Verbalized understanding of d/c instructions, handout given.  Pt voiced no complaints at this time, verbalized relief of abdominal distention.    LR @ 30

## 2023-04-05 NOTE — ASU DISCHARGE PLAN (ADULT/PEDIATRIC) - CALL YOUR DOCTOR IF YOU HAVE ANY OF THE FOLLOWING:
Bleeding that does not stop/Pain not relieved by Medications/Fever greater than (need to indicate Fahrenheit or Celsius) Bleeding that does not stop/Pain not relieved by Medications/Fever greater than (need to indicate Fahrenheit or Celsius)/Wound/Surgical Site with redness, or foul smelling discharge or pus/Nausea and vomiting that does not stop/Unable to urinate/Inability to tolerate liquids or foods

## 2023-04-05 NOTE — ASU DISCHARGE PLAN (ADULT/PEDIATRIC) - NS MD DC FALL RISK RISK
For information on Fall & Injury Prevention, visit: [email or web address] OR  [email or web address] OR  https://www.cdc.gov/steadi/patient.html

## 2023-04-05 NOTE — ASU DISCHARGE PLAN (ADULT/PEDIATRIC) - ASU DC SPECIAL INSTRUCTIONSFT
No nose blowing  Cough and sneeze with mouth open  No Bending  Ice to face 20minutes on 20minutes off   Avoid foods/liquids that are hot, room temperature or cool best.

## 2023-04-06 ENCOUNTER — APPOINTMENT (OUTPATIENT)
Dept: OTOLARYNGOLOGY | Facility: CLINIC | Age: 53
End: 2023-04-06

## 2023-04-10 ENCOUNTER — APPOINTMENT (OUTPATIENT)
Dept: OTOLARYNGOLOGY | Facility: CLINIC | Age: 53
End: 2023-04-10
Payer: COMMERCIAL

## 2023-04-10 VITALS
SYSTOLIC BLOOD PRESSURE: 125 MMHG | WEIGHT: 182 LBS | BODY MASS INDEX: 29.25 KG/M2 | HEIGHT: 66 IN | DIASTOLIC BLOOD PRESSURE: 77 MMHG | HEART RATE: 84 BPM | TEMPERATURE: 97.3 F

## 2023-04-10 PROBLEM — S02.2XXA FRACTURE OF NASAL BONES, INITIAL ENCOUNTER FOR CLOSED FRACTURE: Chronic | Status: ACTIVE | Noted: 2023-03-29

## 2023-04-10 PROBLEM — R00.2 PALPITATIONS: Chronic | Status: ACTIVE | Noted: 2023-03-29

## 2023-04-10 PROCEDURE — 99024 POSTOP FOLLOW-UP VISIT: CPT

## 2023-04-10 NOTE — REASON FOR VISIT
[Post-Operative Visit] : a post-operative visit [FreeTextEntry2] : 5 days s/p closed reduction nasal fracture

## 2023-04-10 NOTE — ASSESSMENT
[FreeTextEntry1] : Doing great nose looks great cast was removed endoscopically debrided sees me photos were taken follow-up and see us in 1 month.

## 2023-04-10 NOTE — HISTORY OF PRESENT ILLNESS
[de-identified] : patient is 5 days s/p closed reduction nasal fracture. She is not having any issues with nosebleeds and denies sinus pressure or nasal pain. She has been on her antibiotics. The cast is still present over the nasal dorsum. SHe has moderate nasal congestion

## 2023-05-08 ENCOUNTER — APPOINTMENT (OUTPATIENT)
Dept: OTOLARYNGOLOGY | Facility: CLINIC | Age: 53
End: 2023-05-08
Payer: COMMERCIAL

## 2023-05-08 VITALS
WEIGHT: 182 LBS | DIASTOLIC BLOOD PRESSURE: 77 MMHG | HEART RATE: 80 BPM | SYSTOLIC BLOOD PRESSURE: 123 MMHG | TEMPERATURE: 97.9 F | HEIGHT: 66 IN | BODY MASS INDEX: 29.25 KG/M2

## 2023-05-08 DIAGNOSIS — Z98.890 OTHER SPECIFIED POSTPROCEDURAL STATES: ICD-10-CM

## 2023-05-08 DIAGNOSIS — J34.89 OTHER SPECIFIED DISORDERS OF NOSE AND NASAL SINUSES: ICD-10-CM

## 2023-05-08 PROCEDURE — 99024 POSTOP FOLLOW-UP VISIT: CPT

## 2023-05-08 NOTE — ASSESSMENT
[FreeTextEntry1] : Patient follows up nose is healed endoscopically deviated septum to the left no abnormality no more restrictions can resume all of her normal activity she is interested in Botox understands there is an out-of-pocket expense we gave her a price of 500 she will get back and reschedule us in a couple of weeks for 50 units.

## 2023-05-08 NOTE — END OF VISIT
[FreeTextEntry3] : I, Dr. Camargo personally performed the evaluation and management (E/M) services , including all procedures, for this established patient who presents today with (a) new problem(s)/exacerbation of (an) existing condition(s). That E/M includes conducting the clinically appropriate interval history &/or exam, assessing all new/exacerbated conditions, and establishing a new plan of care. Today, my DEMARIO, Nikki Cole, was here to observe &/or participate in the visit & follow plan of care established by me.\par \par \par

## 2023-05-08 NOTE — HISTORY OF PRESENT ILLNESS
[de-identified] : Patient is 1 month s/p closed reduction nasal fracture and is doing well overall. She does not have any nasal congestion or runny nose. SHe is breathing well through both nasal cavities

## 2023-06-05 ENCOUNTER — APPOINTMENT (OUTPATIENT)
Dept: OTOLARYNGOLOGY | Facility: CLINIC | Age: 53
End: 2023-06-05

## 2023-10-09 NOTE — ED ADULT NURSE NOTE - FINAL NURSING ELECTRONIC SIGNATURE
ATTENDING STATEMENT    I discussed the case with the Resident. I agree with the Resident's findings and plan, as documented in today's note.      Dr. Marixa Cowart 27-Nov-2020 22:05

## 2025-02-26 ENCOUNTER — EMERGENCY (EMERGENCY)
Facility: HOSPITAL | Age: 55
LOS: 1 days | Discharge: HOME | End: 2025-02-26
Attending: STUDENT IN AN ORGANIZED HEALTH CARE EDUCATION/TRAINING PROGRAM | Admitting: STUDENT IN AN ORGANIZED HEALTH CARE EDUCATION/TRAINING PROGRAM
Payer: COMMERCIAL

## 2025-02-26 VITALS
HEART RATE: 77 BPM | RESPIRATION RATE: 20 BRPM | TEMPERATURE: 96 F | OXYGEN SATURATION: 99 % | HEIGHT: 66 IN | WEIGHT: 184.97 LBS

## 2025-02-26 DIAGNOSIS — Z90.49 ACQUIRED ABSENCE OF OTHER SPECIFIED PARTS OF DIGESTIVE TRACT: Chronic | ICD-10-CM

## 2025-02-26 PROCEDURE — 73562 X-RAY EXAM OF KNEE 3: CPT | Mod: 26,LT

## 2025-02-26 PROCEDURE — 99284 EMERGENCY DEPT VISIT MOD MDM: CPT | Mod: 25

## 2025-02-26 PROCEDURE — 99284 EMERGENCY DEPT VISIT MOD MDM: CPT

## 2025-02-26 PROCEDURE — 93971 EXTREMITY STUDY: CPT | Mod: 26,LT

## 2025-02-26 PROCEDURE — 93971 EXTREMITY STUDY: CPT

## 2025-02-26 PROCEDURE — 96372 THER/PROPH/DIAG INJ SC/IM: CPT

## 2025-02-26 PROCEDURE — 73562 X-RAY EXAM OF KNEE 3: CPT

## 2025-02-26 RX ORDER — ONDANSETRON HCL/PF 4 MG/2 ML
4 VIAL (ML) INJECTION ONCE
Refills: 0 | Status: COMPLETED | OUTPATIENT
Start: 2025-02-26 | End: 2025-02-26

## 2025-02-26 RX ORDER — KETOROLAC TROMETHAMINE 30 MG/ML
30 INJECTION, SOLUTION INTRAMUSCULAR; INTRAVENOUS ONCE
Refills: 0 | Status: DISCONTINUED | OUTPATIENT
Start: 2025-02-26 | End: 2025-02-26

## 2025-02-26 RX ORDER — OXYCODONE HYDROCHLORIDE AND ACETAMINOPHEN 10; 325 MG/1; MG/1
1 TABLET ORAL
Qty: 12 | Refills: 0
Start: 2025-02-26 | End: 2025-03-01

## 2025-02-26 RX ADMIN — KETOROLAC TROMETHAMINE 30 MILLIGRAM(S): 30 INJECTION, SOLUTION INTRAMUSCULAR; INTRAVENOUS at 17:12

## 2025-02-26 RX ADMIN — Medication 4 MILLIGRAM(S): at 15:04

## 2025-04-29 ENCOUNTER — APPOINTMENT (OUTPATIENT)
Dept: ORTHOPEDIC SURGERY | Facility: CLINIC | Age: 55
End: 2025-04-29
Payer: COMMERCIAL

## 2025-04-29 DIAGNOSIS — Z00.00 ENCOUNTER FOR GENERAL ADULT MEDICAL EXAMINATION W/OUT ABNORMAL FINDINGS: ICD-10-CM

## 2025-04-29 DIAGNOSIS — I10 ESSENTIAL (PRIMARY) HYPERTENSION: ICD-10-CM

## 2025-04-29 DIAGNOSIS — E11.9 TYPE 2 DIABETES MELLITUS W/OUT COMPLICATIONS: ICD-10-CM

## 2025-04-29 DIAGNOSIS — M17.12 UNILATERAL PRIMARY OSTEOARTHRITIS, LEFT KNEE: ICD-10-CM

## 2025-04-29 DIAGNOSIS — M17.11 UNILATERAL PRIMARY OSTEOARTHRITIS, RIGHT KNEE: ICD-10-CM

## 2025-04-29 PROCEDURE — 99204 OFFICE O/P NEW MOD 45 MIN: CPT | Mod: 25

## 2025-04-29 PROCEDURE — 20610 DRAIN/INJ JOINT/BURSA W/O US: CPT | Mod: 50

## 2025-04-29 PROCEDURE — 73564 X-RAY EXAM KNEE 4 OR MORE: CPT | Mod: 50

## 2025-04-29 RX ORDER — MELOXICAM 15 MG/1
15 TABLET ORAL
Qty: 30 | Refills: 2 | Status: ACTIVE | COMMUNITY
Start: 2025-04-29 | End: 1900-01-01

## 2025-06-27 ENCOUNTER — RX RENEWAL (OUTPATIENT)
Age: 55
End: 2025-06-27

## (undated) DEVICE — DRSG TAPE MEDIPORE 1"

## (undated) DEVICE — DRSG SPLINT INTRA NASAL .5MM OVERSIZE THICK

## (undated) DEVICE — POSITIONER FOAM EGG CRATE ULNAR 2PCS (PINK)

## (undated) DEVICE — STRYKER 4-PORT MANIFOLD W/SPECIMEN COLLECTION

## (undated) DEVICE — LABELS BLANK W PEN

## (undated) DEVICE — VISITEC 4X4

## (undated) DEVICE — SUT SILK 6-0 18" G-1

## (undated) DEVICE — SUT ETHILON 3-0 18" FS-1

## (undated) DEVICE — DRSG TELFA 3 X 8

## (undated) DEVICE — MARKING PEN W RULER

## (undated) DEVICE — SUT CHROMIC 4-0 18" G-2

## (undated) DEVICE — PACK SMR

## (undated) DEVICE — DRSG MEROCEL 2000 WITH STRING 8CM

## (undated) DEVICE — PACKING GAUZE PLAIN 0.5"

## (undated) DEVICE — GLV 7.5 PROTEXIS (WHITE)

## (undated) DEVICE — SOL IRR POUR NS 0.9% 500ML

## (undated) DEVICE — SYR LUER LOK 5CC

## (undated) DEVICE — WARMING BLANKET LOWER ADULT

## (undated) DEVICE — SUT PLAIN GUT 4-0 18" SC-1

## (undated) DEVICE — POSITIONER PATIENT SAFETY STRAP 3X60"

## (undated) DEVICE — ELCTR GROUNDING PAD ADULT COVIDIEN

## (undated) DEVICE — DRSG MASTISOL